# Patient Record
Sex: FEMALE | Race: BLACK OR AFRICAN AMERICAN | Employment: UNEMPLOYED | URBAN - METROPOLITAN AREA
[De-identification: names, ages, dates, MRNs, and addresses within clinical notes are randomized per-mention and may not be internally consistent; named-entity substitution may affect disease eponyms.]

---
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
## 2025-02-20 ENCOUNTER — HOSPITAL ENCOUNTER (EMERGENCY)
Facility: HOSPITAL | Age: 20
Discharge: HOME | End: 2025-02-20
Payer: MEDICAID

## 2025-02-20 VITALS
HEIGHT: 55 IN | RESPIRATION RATE: 16 BRPM | OXYGEN SATURATION: 100 % | HEART RATE: 83 BPM | DIASTOLIC BLOOD PRESSURE: 84 MMHG | TEMPERATURE: 97.3 F | SYSTOLIC BLOOD PRESSURE: 140 MMHG

## 2025-02-20 DIAGNOSIS — U07.1 COVID: Primary | ICD-10-CM

## 2025-02-20 LAB
FLUAV RNA SPEC QL NAA+PROBE: NOT DETECTED
FLUBV RNA SPEC QL NAA+PROBE: NOT DETECTED
S PYO DNA THROAT QL NAA+PROBE: NOT DETECTED
SARS-COV-2 RNA RESP QL NAA+PROBE: DETECTED
SOURCE: ABNORMAL

## 2025-02-20 PROCEDURE — 87651 STREP A DNA AMP PROBE: CPT

## 2025-02-20 PROCEDURE — 87636 SARSCOV2 & INF A&B AMP PRB: CPT

## 2025-02-20 PROCEDURE — 99283 EMERGENCY DEPT VISIT LOW MDM: CPT

## 2025-02-21 NOTE — ED PROVIDER NOTES
PROCEDURES   Unless otherwise noted below, none  Procedures      EMERGENCY DEPARTMENT COURSE and DIFFERENTIAL DIAGNOSIS/MDM   Vitals:    Vitals:    02/20/25 1741   BP: (!) 140/84   Pulse: 83   Resp: 16   Temp: 97.3 °F (36.3 °C)   TempSrc: Temporal   SpO2: 100%   Height: 1.397 m (4' 7\")       Patient was given the following medications:  Medications - No data to display        Records Reviewed (source and summary): Nursing notes.  Previous radiology studies,\"\"None.      ED COURSE       Medial Decision Making:  DDX: covid, influenza, URI     19 y.o. female overall well appearing, in NAD presents for evaluation of influenza like symptoms.  In evaluation of the above differential diagnosis, consideration was given to the following tests and treatments: The patient presents with diffuse headache, nasal congestion and sore throat and concerning for suspected viral syndrome..  Patient is otherwise well-appearing with reassuring exam, and I have low suspicion for another emergent source of infection including pneumonia, intra-abdominal infection, UTI or CNS infection.  Swabbed for influenza/covid, patient is covid positive. Recommend increasing oral hydration. Alternate tylenol/motrin as needed for pain/fever.  Discussed strict return precautions and supportive care including hydration, rest, OTC analgesics, and close follow-up.  I discussed each of these tests and considerations with the patient. They agree with the plan of discharge.      FINAL IMPRESSION     1. COVID          DISPOSITION/PLAN   DISPOSITION Decision To Discharge 02/20/2025 07:10:54 PM   DISPOSITION CONDITION Stable      PATIENT REFERRED TO:  Nacho Grajeda MD  [address] [place] [id number] [phone]      As needed    [facility]  [address]  [place] [id number] [phone]    As needed, If symptoms worsen         DISCHARGE MEDICATIONS:     Medication List        ASK your doctor about these